# Patient Record
Sex: FEMALE | Race: WHITE | NOT HISPANIC OR LATINO | Employment: STUDENT | URBAN - METROPOLITAN AREA
[De-identification: names, ages, dates, MRNs, and addresses within clinical notes are randomized per-mention and may not be internally consistent; named-entity substitution may affect disease eponyms.]

---
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
## 2024-03-18 ENCOUNTER — OFFICE VISIT (OUTPATIENT)
Dept: PEDIATRIC CARDIOLOGY | Facility: CLINIC | Age: 19
End: 2024-03-18
Payer: COMMERCIAL

## 2024-03-18 ENCOUNTER — CLINICAL SUPPORT (OUTPATIENT)
Dept: PEDIATRIC CARDIOLOGY | Facility: CLINIC | Age: 19
End: 2024-03-18
Payer: COMMERCIAL

## 2024-03-18 VITALS
WEIGHT: 159.17 LBS | HEIGHT: 62 IN | DIASTOLIC BLOOD PRESSURE: 78 MMHG | SYSTOLIC BLOOD PRESSURE: 132 MMHG | HEART RATE: 93 BPM | OXYGEN SATURATION: 97 % | BODY MASS INDEX: 29.29 KG/M2

## 2024-03-18 DIAGNOSIS — Q20.0 TRUNCUS ARTERIOSUS (HHS-HCC): ICD-10-CM

## 2024-03-18 PROCEDURE — 93325 DOPPLER ECHO COLOR FLOW MAPG: CPT | Performed by: PEDIATRICS

## 2024-03-18 PROCEDURE — 1036F TOBACCO NON-USER: CPT | Performed by: PEDIATRICS

## 2024-03-18 PROCEDURE — 93320 DOPPLER ECHO COMPLETE: CPT | Performed by: PEDIATRICS

## 2024-03-18 PROCEDURE — 99215 OFFICE O/P EST HI 40 MIN: CPT | Performed by: PEDIATRICS

## 2024-03-18 PROCEDURE — 93303 ECHO TRANSTHORACIC: CPT | Performed by: PEDIATRICS

## 2024-03-18 RX ORDER — MULTIVIT-MIN/IRON FUM/FOLIC AC 7.5 MG-4
1 TABLET ORAL DAILY
COMMUNITY

## 2024-03-18 NOTE — LETTER
March 21, 2024     Carmen Trinidad MD  [address]  [place] [address]    Patient: Beth Sandoval   YOB: 2005   Date of Visit: 3/18/2024       Dear Dr. Carmen Trinidad MD:    Thank you for referring Beth Sandoval to me for evaluation. Below are my notes for this consultation.  If you have questions, please do not hesitate to call me. I look forward to following your patient along with you.       Sincerely,     William Gutierrez MD      CC: No Recipients  ______________________________________________________________________________________    We had the pleasure of seeing Beth Sandoval for a Pediatric Cardiology follow up evaluation. As you know Beth Sandoval is a 18 y.o. 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 She is here for routine follow up follow up. She was last seen in clinic on 3/20/2023.      History: born with Truncus arteriosus Type I s/p repair with VSD closure and placement of a 11 mm RV-PA conduit (2004), s/p 16 mm aortic homograft conduit attachment to a 20mm [place]-Juan Pablo ring due to conduit stenosis (06/02/2010), s/p Palmaz stent in the RV-PA conduit due to conduit stenosis (03/06/2015), s/p Lexie TPVR due to conduit stenosis with regurgitation and decline in VO2 (01/12/2017).     Beth gets a little short of breath when she is going all out in tennis. She has taken up roller skating this past year. Otherwise, there have been no symptoms related to the cardiovascular system. In particular, there is no history of chest pain, cyanosis, tachypnea, shortness of breath, dizziness or syncope. There are no fevers, feeding difficulty, decreased activity or weight loss.    Medications: has a current medication list which includes the following prescription(s): aspirin and multivitamin with minerals.  Past medical history:   Past Medical History:   Diagnosis Date   • Atherosclerotic heart disease of native coronary artery without angina pectoris     Coronary artery  "disease without angina pectoris, unspecified vessel or lesion type, unspecified whether native or transplanted heart   • Di Trevin's syndrome (CMS/[facility]) 08/10/2022    DiGeorge syndrome     Past surgical Hisory:  has a past surgical history that includes Other surgical history (06/04/2018); Other surgical history (06/04/2018); MR angio chest w IV contrast (02/03/2016); MR angio chest w IV contrast (08/29/2012); and Cardiac valuve replacement.  Allergies: has No Known Allergies.  Family history:  Negative for congenital heart disease, cardiomyopathy, long QT syndrome, unexplained seizures, aortic aneurysms, arrhythmias, early atherosclerotic/coronary cardiovascular diseases, congenital deafness and sudden unexpected death.  Social history:   Lives with mother. Active in tennis  Social History    Tobacco Use      Smoking status: Never      Smokeless tobacco: Never   reports that she has never smoked. She has never used smokeless tobacco.    /78 (BP Location: Right arm, Patient Position: Sitting)   Pulse 93   Ht 1.576 m (5' 2.05\")   Wt 72.2 kg (159 lb 2.8 oz)   SpO2 97%   BMI 29.07 kg/m²   She was resting comfortably in the examination room and alert, active and in no respiratory distress. Skin was without rash.  HEENT: moist mucous membranes, no JVD, goiter, carotid thrill or bruit or lymphadenopathy.  She had equal air entry with clear lung fields without crackles, rhonchi or wheeze. She was acyanotic with a normoactive precordium. Normal S1 and physiologically splitting S2. The P2 intensity was normal.  No clicks, rubs or gallops. There were no murmurs either in systole or diastole. Pulses in both upper and lower extremities were normal with no radio-femoral delay.  There was no peripheral edema.   The abdomen was soft, nontender with normal bowel sounds.  The liver was not palpable.  The spleen tip was not palpable.  She had a normal gait and normal strength in all extremities.  Cranial nerves II - XII are " intact.      Echocardiogram:  A two-dimensional sector scan and Doppler examination performed today and reviewed by me show:   1. Mildly dilated right atrium.   2. Estimated RV systolic pressure from the trivial TR jet is at least 43 mmHg above the right atrial V wave. Systolic blood pressure 121 mmHg.   3. Mildly dilated, mildly hypertrophied right ventricle and qualitatively normal systolic function. Mildly flattened interventricular septal motion.   4. Trivial to mild truncal valve insufficiency and no stenosis, mildly dilated truncal root.   5. Lexie valve peak/mean gradients are 38/20 mmHg_mildly increased from previous study.   6. Mildly prominent left and right pulmonary arteries and no proximal stenosis.   7. Truncus arteriosus status post repair.   8. Left ventricle is normal in size. Normal systolic function.   9. No residual ventricular septal defect.    Provider Impressions     Beth is an 18 y.o. 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 She continues to have mild color flow acceleration across the Lexie valve with trivial regurgitation and a well functioning truncal valve with mild regurgitation and no stenosis. She continues to be asymptomatic from a cardiac perspective.     1. Continue Aspirin 81 mg lifelong  2. SBE prophylaxis is indicated lifelong   3. No activity restrictions or interventions needed at this point in time  4. Follow up in 12 months with an ECG and echocardiogram  5. EST and cMRI in 1 years     Thank you for allowing me to participate in Beth's care.  If you have any further questions, please do not hesitate to contact me.     William Gutierrez M.D.  [facility], Director  Ambulatory Pediatric Cardiology   Division of Pediatric Cardiology  [facility]  [facility]  [facility]   of Pediatrics, [facility]  [facility] -   [address]  [place] [id number]  Office:  [phone]  Fax:       [phone]  e-mail:  [email or web address]

## 2024-03-18 NOTE — PATIENT INSTRUCTIONS
You have truncus arteriosus and have had complete repair. You have mild blockage across the valve after your transcatheter pulmonary valve (TPV) placement. I do not see significant backwards flow across the valve = pulmonary regurgitation.   You need to take antibiotics before dental visits. It is very important to practice good tooth brushing and flossing for healthy gums. You have been very stable over the past 3 years.  I would like you to continue your aspirin daily.  No need for other medications.  No restrictions to activity.  I would like to see you back in 1 year for echo and ECG.  We will repeat your cardiac MRI and exercise test in 2025.   If you feel any symptoms or concerns in the meantime, please call us on [phone].

## 2024-03-18 NOTE — PROGRESS NOTES
We had the pleasure of seeing Beth Sandoval for a Pediatric Cardiology follow up evaluation. As you know Beth Sandoval is a 18 y.o. 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 She is here for routine follow up follow up. She was last seen in clinic on 3/20/2023.      History: born with Truncus arteriosus Type I s/p repair with VSD closure and placement of a 11 mm RV-PA conduit (2004), s/p 16 mm aortic homograft conduit attachment to a 20mm [place]-Juan Pablo ring due to conduit stenosis (06/02/2010), s/p Palmaz stent in the RV-PA conduit due to conduit stenosis (03/06/2015), s/p Lexie TPVR due to conduit stenosis with regurgitation and decline in VO2 (01/12/2017).     Beth gets a little short of breath when she is going all out in tennis. She has taken up roller skating this past year. Otherwise, there have been no symptoms related to the cardiovascular system. In particular, there is no history of chest pain, cyanosis, tachypnea, shortness of breath, dizziness or syncope. There are no fevers, feeding difficulty, decreased activity or weight loss.    Medications: has a current medication list which includes the following prescription(s): aspirin and multivitamin with minerals.  Past medical history:   Past Medical History:   Diagnosis Date    Atherosclerotic heart disease of native coronary artery without angina pectoris     Coronary artery disease without angina pectoris, unspecified vessel or lesion type, unspecified whether native or transplanted heart    Di Trevin's syndrome (CMS/[facility]) 08/10/2022    DiGeorge syndrome     Past surgical Hisory:  has a past surgical history that includes Other surgical history (06/04/2018); Other surgical history (06/04/2018); MR angio chest w IV contrast (02/03/2016); MR angio chest w IV contrast (08/29/2012); and Cardiac valuve replacement.  Allergies: has No Known Allergies.  Family history:  Negative for congenital heart disease, cardiomyopathy, long QT  "syndrome, unexplained seizures, aortic aneurysms, arrhythmias, early atherosclerotic/coronary cardiovascular diseases, congenital deafness and sudden unexpected death.  Social history:   Lives with mother. Active in tennis  Social History    Tobacco Use      Smoking status: Never      Smokeless tobacco: Never   reports that she has never smoked. She has never used smokeless tobacco.    /78 (BP Location: Right arm, Patient Position: Sitting)   Pulse 93   Ht 1.576 m (5' 2.05\")   Wt 72.2 kg (159 lb 2.8 oz)   SpO2 97%   BMI 29.07 kg/m²   She was resting comfortably in the examination room and alert, active and in no respiratory distress. Skin was without rash.  HEENT: moist mucous membranes, no JVD, goiter, carotid thrill or bruit or lymphadenopathy.  She had equal air entry with clear lung fields without crackles, rhonchi or wheeze. She was acyanotic with a normoactive precordium. Normal S1 and physiologically splitting S2. The P2 intensity was normal.  No clicks, rubs or gallops. There were no murmurs either in systole or diastole. Pulses in both upper and lower extremities were normal with no radio-femoral delay.  There was no peripheral edema.   The abdomen was soft, nontender with normal bowel sounds.  The liver was not palpable.  The spleen tip was not palpable.  She had a normal gait and normal strength in all extremities.  Cranial nerves II - XII are intact.      Echocardiogram:  A two-dimensional sector scan and Doppler examination performed today and reviewed by me show:   1. Mildly dilated right atrium.   2. Estimated RV systolic pressure from the trivial TR jet is at least 43 mmHg above the right atrial V wave. Systolic blood pressure 121 mmHg.   3. Mildly dilated, mildly hypertrophied right ventricle and qualitatively normal systolic function. Mildly flattened interventricular septal motion.   4. Trivial to mild truncal valve insufficiency and no stenosis, mildly dilated truncal root.   5. Lexie " valve peak/mean gradients are 38/20 mmHg_mildly increased from previous study.   6. Mildly prominent left and right pulmonary arteries and no proximal stenosis.   7. Truncus arteriosus status post repair.   8. Left ventricle is normal in size. Normal systolic function.   9. No residual ventricular septal defect.    Provider Impressions     Beth is an 18 y.o. 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 She continues to have mild color flow acceleration across the Lexie valve with trivial regurgitation and a well functioning truncal valve with mild regurgitation and no stenosis. She continues to be asymptomatic from a cardiac perspective.     1. Continue Aspirin 81 mg lifelong  2. SBE prophylaxis is indicated lifelong   3. No activity restrictions or interventions needed at this point in time  4. Follow up in 12 months with an ECG and echocardiogram  5. EST and cMRI in 1 years     Thank you for allowing me to participate in Beth's care.  If you have any further questions, please do not hesitate to contact me.     William Gutierrez M.D.  [facility], Director  Ambulatory Pediatric Cardiology   Division of Pediatric Cardiology  [facility]  [facility]   of Pediatrics, [facility]  [facility] [address]  [place] [address]  Office:  [phone]  Fax:       [phone]  e-mail:  [email or web address]

## 2024-03-19 LAB
AORTIC VALVE PEAK VELOCITY: 1.11 M/S
AV PEAK GRADIENT: 4.9 MMHG
EJECTION FRACTION APICAL 4 CHAMBER: 63
LEFT VENTRICLE INTERNAL DIMENSION DIASTOLE MMODE: 5.67 CM
MITRAL VALVE E/A RATIO: 1.71
MITRAL VALVE E/E' RATIO: 4.88
PULMONIC VALVE MEAN GRADIENT: 20.4 MMHG
PULMONIC VALVE PEAK GRADIENT: 38.2 MMHG
TRICUSPID ANNULAR PLANE SYSTOLIC EXCURSION: 1.7 CM

## 2024-10-31 ENCOUNTER — OFFICE VISIT (OUTPATIENT)
Dept: PEDIATRIC CARDIOLOGY | Facility: CLINIC | Age: 19
End: 2024-10-31
Payer: COMMERCIAL

## 2024-10-31 ENCOUNTER — HOSPITAL ENCOUNTER (OUTPATIENT)
Dept: PEDIATRIC CARDIOLOGY | Facility: CLINIC | Age: 19
Discharge: HOME | End: 2024-10-31
Payer: COMMERCIAL

## 2024-10-31 VITALS
DIASTOLIC BLOOD PRESSURE: 99 MMHG | OXYGEN SATURATION: 100 % | HEART RATE: 79 BPM | SYSTOLIC BLOOD PRESSURE: 163 MMHG | BODY MASS INDEX: 25.55 KG/M2 | HEIGHT: 63 IN | WEIGHT: 144.18 LBS

## 2024-10-31 DIAGNOSIS — R00.2 PALPITATIONS: ICD-10-CM

## 2024-10-31 LAB
ATRIAL RATE: 70 BPM
BODY SURFACE AREA: 1.7 M2
P AXIS: 18 DEGREES
P OFFSET: 212 MS
P ONSET: 170 MS
PR INTERVAL: 116 MS
Q ONSET: 228 MS
QRS COUNT: 11 BEATS
QRS DURATION: 148 MS
QT INTERVAL: 414 MS
QTC CALCULATION(BAZETT): 447 MS
QTC FREDERICIA: 436 MS
R AXIS: 42 DEGREES
T AXIS: 36 DEGREES
T OFFSET: 435 MS
VENTRICULAR RATE: 70 BPM

## 2024-10-31 PROCEDURE — 99215 OFFICE O/P EST HI 40 MIN: CPT | Performed by: STUDENT IN AN ORGANIZED HEALTH CARE EDUCATION/TRAINING PROGRAM

## 2024-10-31 PROCEDURE — 93005 ELECTROCARDIOGRAM TRACING: CPT | Performed by: STUDENT IN AN ORGANIZED HEALTH CARE EDUCATION/TRAINING PROGRAM

## 2024-10-31 PROCEDURE — 3008F BODY MASS INDEX DOCD: CPT | Performed by: STUDENT IN AN ORGANIZED HEALTH CARE EDUCATION/TRAINING PROGRAM

## 2024-10-31 PROCEDURE — 93242 EXT ECG>48HR<7D RECORDING: CPT

## 2024-10-31 PROCEDURE — 93010 ELECTROCARDIOGRAM REPORT: CPT | Performed by: STUDENT IN AN ORGANIZED HEALTH CARE EDUCATION/TRAINING PROGRAM

## 2024-10-31 PROCEDURE — 1036F TOBACCO NON-USER: CPT | Performed by: STUDENT IN AN ORGANIZED HEALTH CARE EDUCATION/TRAINING PROGRAM

## 2024-10-31 ASSESSMENT — ENCOUNTER SYMPTOMS: PALPITATIONS: 1

## 2024-11-13 ENCOUNTER — TELEPHONE (OUTPATIENT)
Dept: PEDIATRIC CARDIOLOGY | Facility: HOSPITAL | Age: 19
End: 2024-11-13
Payer: COMMERCIAL

## 2024-11-13 LAB — BODY SURFACE AREA: 1.7 M2

## 2024-11-13 NOTE — TELEPHONE ENCOUNTER
This RN called on behalf of Dr. Garcia to relay Holter monitor results. Mom verbalized understanding with no further questions at this time.     Urged family to call with any other concerns or questions.     ANALY Fragoso      ----- Message from Jan Garcia sent at 11/13/2024  1:03 PM EST -----  Regarding: normal holter  Hi all,    Please let patient know that holter was normal.    Thank you,  Jan

## 2025-01-03 ENCOUNTER — APPOINTMENT (OUTPATIENT)
Dept: OTOLARYNGOLOGY | Facility: CLINIC | Age: 20
End: 2025-01-03
Payer: COMMERCIAL

## 2025-01-03 VITALS — WEIGHT: 148.6 LBS | BODY MASS INDEX: 26.33 KG/M2 | HEIGHT: 63 IN | TEMPERATURE: 97.4 F

## 2025-01-03 DIAGNOSIS — H61.23 BILATERAL IMPACTED CERUMEN: Primary | ICD-10-CM

## 2025-01-03 PROCEDURE — 3008F BODY MASS INDEX DOCD: CPT | Performed by: OTOLARYNGOLOGY

## 2025-01-03 PROCEDURE — 99213 OFFICE O/P EST LOW 20 MIN: CPT | Performed by: OTOLARYNGOLOGY

## 2025-01-03 PROCEDURE — 1036F TOBACCO NON-USER: CPT | Performed by: OTOLARYNGOLOGY

## 2025-01-03 NOTE — PROGRESS NOTES
"ALEJANDRINA Sandoval is a 19 y.o. female history of narrow ear canals, prone to sticky wax.  Feeling itchy in the ear canals bilaterally.  Hearing is reasonably stable although intermittent occlusion on the right..  Denies otalgia and otorrhea.  Last seen December 2023      Past Medical History:   Diagnosis Date    Atherosclerotic heart disease of native coronary artery without angina pectoris     Coronary artery disease without angina pectoris, unspecified vessel or lesion type, unspecified whether native or transplanted heart    Di Trevin's syndrome ([facility]) 08/10/2022    DiGeorge syndrome            Medications:     Current Outpatient Medications:     aspirin 81 mg EC tablet, Take 1 tablet (81 mg) by mouth once daily., Disp: , Rfl:     multivitamin with minerals tablet, Take 1 tablet by mouth once daily., Disp: , Rfl:      Allergies:  No Known Allergies     Physical Exam:  Last Recorded Vitals  Temperature 36.3 °C (97.4 °F), height 1.598 m (5' 2.9\"), weight 67.4 kg (148 lb 9.6 oz).  General:     General appearance: Well-developed, well-nourished in no acute distress.       Voice:  normal       Head/face: Normal appearance; nontender to palpation     Facial nerve function: Normal and symmetric bilaterally.    Oral/oropharynx:     Oral vestibule: Normal labial and gingival mucosa     Tongue/floor of mouth: Normal without lesion     Oropharynx: Clear.  No lesions present of the hard/soft palate, posterior pharynx    Neck:     Neck: Normal appearance, trachea midline     Salivary glands: Normal to palpation bilaterally     Lymph nodes: No cervical lymphadenopathy to palpation     Thyroid: No thyromegaly.  No palpable nodules     Range of motion: Normal    Neurological:     Cortical functions: Alert and oriented x3, appropriate affect       Larynx/hypopharynx:     Laryngeal findings: Mirror exam inadequate or limited secondary to enlarged base of tongue and/or excessive gagging    Ear:     Ear canal: Normal bilaterally " after cleaning cerumen impaction bilaterally with suction.     Tympanic membrane: Intact and mobile bilaterally     Pinna: Normal bilaterally     Hearing:  Gross hearing assessment normal by voice    Nose:     Visualized using: Anterior rhinoscopy     Nasopharynx: Inadequate mirror exam secondary to gag, anatomy.       Nasal dorsum: Nontraumatic midline appearance     Septum: Midline     Inferior turbinates: Normally sized     Mucosa: Bilateral, pink, normal appearing       Assessment/Plan   Ears cleaned bilaterally.  Hearing is normal at this point.  Recommend antidandruff shampoo, mineral oil, continued monitoring and recheck in a year, sooner as needed      Matt Harris MD

## 2025-01-17 ENCOUNTER — APPOINTMENT (OUTPATIENT)
Dept: OTOLARYNGOLOGY | Facility: CLINIC | Age: 20
End: 2025-01-17
Payer: COMMERCIAL

## 2025-03-10 ENCOUNTER — APPOINTMENT (OUTPATIENT)
Dept: PEDIATRIC CARDIOLOGY | Facility: CLINIC | Age: 20
End: 2025-03-10
Payer: COMMERCIAL

## 2025-04-07 ENCOUNTER — APPOINTMENT (OUTPATIENT)
Dept: PEDIATRIC CARDIOLOGY | Facility: CLINIC | Age: 20
End: 2025-04-07
Payer: COMMERCIAL

## 2025-04-07 ENCOUNTER — ANCILLARY PROCEDURE (OUTPATIENT)
Dept: PEDIATRIC CARDIOLOGY | Facility: CLINIC | Age: 20
End: 2025-04-07
Payer: COMMERCIAL

## 2025-04-07 VITALS
HEART RATE: 96 BPM | HEIGHT: 62 IN | SYSTOLIC BLOOD PRESSURE: 120 MMHG | DIASTOLIC BLOOD PRESSURE: 87 MMHG | BODY MASS INDEX: 27.22 KG/M2 | WEIGHT: 147.93 LBS | OXYGEN SATURATION: 100 %

## 2025-04-07 DIAGNOSIS — Q20.0 TRUNCUS ARTERIOSUS (HHS-HCC): Primary | ICD-10-CM

## 2025-04-07 DIAGNOSIS — Q20.0 TRUNCUS ARTERIOSUS (HHS-HCC): ICD-10-CM

## 2025-04-07 LAB
ATRIAL RATE: 90 BPM
P AXIS: 28 DEGREES
P OFFSET: 216 MS
P ONSET: 163 MS
PR INTERVAL: 130 MS
Q ONSET: 228 MS
QRS COUNT: 15 BEATS
QRS DURATION: 144 MS
QT INTERVAL: 368 MS
QTC CALCULATION(BAZETT): 450 MS
QTC FREDERICIA: 421 MS
R AXIS: 31 DEGREES
T AXIS: 34 DEGREES
T OFFSET: 412 MS
VENTRICULAR RATE: 90 BPM

## 2025-04-07 PROCEDURE — 3008F BODY MASS INDEX DOCD: CPT | Performed by: PEDIATRICS

## 2025-04-07 PROCEDURE — 93303 ECHO TRANSTHORACIC: CPT | Performed by: PEDIATRICS

## 2025-04-07 PROCEDURE — 93325 DOPPLER ECHO COLOR FLOW MAPG: CPT | Performed by: PEDIATRICS

## 2025-04-07 PROCEDURE — 93320 DOPPLER ECHO COMPLETE: CPT | Performed by: PEDIATRICS

## 2025-04-07 PROCEDURE — 93000 ELECTROCARDIOGRAM COMPLETE: CPT | Performed by: PEDIATRICS

## 2025-04-07 PROCEDURE — 99215 OFFICE O/P EST HI 40 MIN: CPT | Performed by: PEDIATRICS

## 2025-04-07 NOTE — LETTER
April 8, 2025     Carmen Trinidad MD  [address]  [place] [address]    Patient: Beth Sandoval   YOB: 2005   Date of Visit: 4/7/2025       Dear Dr. Carmen Trinidad MD:    Thank you for referring Beth Sandoval to me for evaluation. Below are my notes for this consultation.  If you have questions, please do not hesitate to call me. I look forward to following your patient along with you.       Sincerely,     William Gutierrez MD      CC: No Recipients  ______________________________________________________________________________________    We had the pleasure of seeing Beth Sandoval for a Pediatric Cardiology follow up evaluation. As you know Beth Sandoval is a 19 y.o. 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 She is here for routine follow up follow up. She was last seen in clinic on 3/18/2024.  Since her last visit she was seen by Dr. JAMARI Garcia for complaints of palpitations. Beth reports that these have since resolved. At that time she wore a Holter for three days which was normal.     History: born with Truncus arteriosus Type I s/p repair with VSD closure and placement of a 11 mm RV-PA conduit (2004), s/p 16 mm aortic homograft conduit attachment to a 20mm [place] ring due to conduit stenosis (06/02/2010), s/p Palmaz stent in the RV-PA conduit due to conduit stenosis (03/06/2015), s/p Lexie TPVR due to conduit stenosis with regurgitation and decline in VO2 (01/12/2017).     Beth is active in tennis and roller skating. The shortness of breath she experienced last year has resolved. Otherwise, there have been no symptoms related to the cardiovascular system. In particular, there is no history of chest pain, cyanosis, tachypnea, shortness of breath, dizziness or syncope. There are no fevers, feeding difficulty, decreased activity or weight loss.    Medications: has a current medication list which includes the following prescription(s): aspirin and multivitamin  "with minerals.  Past medical history:   Past Medical History:   Diagnosis Date   • Atherosclerotic heart disease of native coronary artery without angina pectoris     Coronary artery disease without angina pectoris, unspecified vessel or lesion type, unspecified whether native or transplanted heart   • Di Trevin's syndrome (Multi) 08/10/2022    DiGeorge syndrome     Past surgical Hisory:  has a past surgical history that includes Other surgical history (06/04/2018); Other surgical history (06/04/2018); MR angio chest w IV contrast (02/03/2016); MR angio chest w IV contrast (08/29/2012); and Cardiac valuve replacement.  Allergies: has No Known Allergies.  Family history:  Negative for congenital heart disease, cardiomyopathy, long QT syndrome, unexplained seizures, aortic aneurysms, arrhythmias, early atherosclerotic/coronary cardiovascular diseases, congenital deafness and sudden unexpected death.  Social history:   Lives with mother. Active in tennis  Social History     Tobacco Use   Smoking Status Never   Smokeless Tobacco Never    reports that she has never smoked. She has never used smokeless tobacco.    /87 (BP Location: Right arm, Patient Position: Sitting)   Pulse 96   Ht 1.572 m (5' 1.89\")   Wt 67.1 kg (147 lb 14.9 oz)   SpO2 100%   BMI 27.15 kg/m²   She was resting comfortably in the examination room and alert, active and in no respiratory distress. Skin was without rash.  HEENT: moist mucous membranes, no JVD, goiter, carotid thrill or bruit or lymphadenopathy.  She had equal air entry with clear lung fields without crackles, rhonchi or wheeze. She was acyanotic with a normoactive precordium. Normal S1 and physiologically splitting S2. The P2 intensity was normal.  No clicks, rubs or gallops. There were no murmurs either in systole or diastole. Pulses in both upper and lower extremities were normal with no radio-femoral delay.  There was no peripheral edema.   The abdomen was soft, nontender " with normal bowel sounds.  The liver was not palpable.  The spleen tip was not palpable.  She had a normal gait and normal strength in all extremities.  Cranial nerves II - XII are intact.      Echocardiogram:  A two-dimensional sector scan and Doppler examination performed today and reviewed by me show:   1. Mildly dilated right atrium.   2. Estimated RV systolic pressure from the trivial TR jet is at least 43 mmHg above the right atrial V wave. Systolic blood pressure 121 mmHg.   3. Mildly dilated, mildly hypertrophied right ventricle and qualitatively normal systolic function. Mildly flattened interventricular septal motion.   4. Trivial to mild truncal valve insufficiency and no stenosis, mildly dilated truncal root.   5. Lexie valve peak/mean gradients are 38/20 mmHg_mildly increased from previous study.   6. Mildly prominent left and right pulmonary arteries and no proximal stenosis.   7. Truncus arteriosus status post repair.   8. Left ventricle is normal in size. Normal systolic function.   9. No residual ventricular septal defect.    Provider Impressions     Beth is an 19 y.o. 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 She continues to have mild color flow acceleration across the Elxie valve with trivial regurgitation and a well functioning truncal valve with mild regurgitation and no stenosis. She continues to be asymptomatic from a cardiac perspective.     1. Continue Aspirin 81 mg lifelong  2. SBE prophylaxis IS indicated lifelong   3. No activity restrictions or interventions needed at this point in time  4. Follow up in 12 months with an ECG and echocardiogram  5. EST and cMRI in this summer     Thank you for allowing me to participate in Beth's care.  If you have any further questions,  please do not hesitate to contact me.     William Gutierrez M.D.  [facility], Director  Ambulatory Pediatric Cardiology   Division of Pediatric Cardiology  [facility]  [facility]   of Pediatrics, [facility]  [facility] [address]  [place] [address]  Office:  [phone]  Fax:       [phone]  e-mail:  [email or web address]

## 2025-04-07 NOTE — PROGRESS NOTES
We had the pleasure of seeing Beth Sandoval for a Pediatric Cardiology follow up evaluation. As you know Beth Sandoval is a 19 y.o. 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 She is here for routine follow up follow up. She was last seen in clinic on 3/18/2024.  Since her last visit she was seen by Dr. JAMARI Garcia for complaints of palpitations. Beth reports that these have since resolved. At that time she wore a Holter for three days which was normal.     History: born with Truncus arteriosus Type I s/p repair with VSD closure and placement of a 11 mm RV-PA conduit (2004), s/p 16 mm aortic homograft conduit attachment to a 20mm [place] ring due to conduit stenosis (06/02/2010), s/p Palmaz stent in the RV-PA conduit due to conduit stenosis (03/06/2015), s/p Lexie TPVR due to conduit stenosis with regurgitation and decline in VO2 (01/12/2017).     Beth is active in tennis and roller skating. The shortness of breath she experienced last year has resolved. Otherwise, there have been no symptoms related to the cardiovascular system. In particular, there is no history of chest pain, cyanosis, tachypnea, shortness of breath, dizziness or syncope. There are no fevers, feeding difficulty, decreased activity or weight loss.    Medications: has a current medication list which includes the following prescription(s): aspirin and multivitamin with minerals.  Past medical history:   Past Medical History:   Diagnosis Date    Atherosclerotic heart disease of native coronary artery without angina pectoris     Coronary artery disease without angina pectoris, unspecified vessel or lesion type, unspecified whether native or transplanted heart    Di Trevin's syndrome (Multi) 08/10/2022    DiGeorge syndrome     Past surgical Hisory:  has a past surgical history that includes Other surgical history (06/04/2018); Other surgical history (06/04/2018); MR angio chest w IV contrast (02/03/2016); MR angio chest w IV  "contrast (08/29/2012); and Cardiac valuve replacement.  Allergies: has No Known Allergies.  Family history:  Negative for congenital heart disease, cardiomyopathy, long QT syndrome, unexplained seizures, aortic aneurysms, arrhythmias, early atherosclerotic/coronary cardiovascular diseases, congenital deafness and sudden unexpected death.  Social history:   Lives with mother. Active in tennis  Social History     Tobacco Use   Smoking Status Never   Smokeless Tobacco Never    reports that she has never smoked. She has never used smokeless tobacco.    /87 (BP Location: Right arm, Patient Position: Sitting)   Pulse 96   Ht 1.572 m (5' 1.89\")   Wt 67.1 kg (147 lb 14.9 oz)   SpO2 100%   BMI 27.15 kg/m²   She was resting comfortably in the examination room and alert, active and in no respiratory distress. Skin was without rash.  HEENT: moist mucous membranes, no JVD, goiter, carotid thrill or bruit or lymphadenopathy.  She had equal air entry with clear lung fields without crackles, rhonchi or wheeze. She was acyanotic with a normoactive precordium. Normal S1 and physiologically splitting S2. The P2 intensity was normal.  No clicks, rubs or gallops. There were no murmurs either in systole or diastole. Pulses in both upper and lower extremities were normal with no radio-femoral delay.  There was no peripheral edema.   The abdomen was soft, nontender with normal bowel sounds.  The liver was not palpable.  The spleen tip was not palpable.  She had a normal gait and normal strength in all extremities.  Cranial nerves II - XII are intact.      Echocardiogram:  A two-dimensional sector scan and Doppler examination performed today and reviewed by me show:   1. Mildly dilated right atrium.   2. Estimated RV systolic pressure from the trivial TR jet is at least 43 mmHg above the right atrial V wave. Systolic blood pressure 121 mmHg.   3. Mildly dilated, mildly hypertrophied right ventricle and qualitatively normal " systolic function. Mildly flattened interventricular septal motion.   4. Trivial to mild truncal valve insufficiency and no stenosis, mildly dilated truncal root.   5. Lexie valve peak/mean gradients are 38/20 mmHg_mildly increased from previous study.   6. Mildly prominent left and right pulmonary arteries and no proximal stenosis.   7. Truncus arteriosus status post repair.   8. Left ventricle is normal in size. Normal systolic function.   9. No residual ventricular septal defect.    Provider Impressions     Beth is an 19 y.o. 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 She continues to have mild color flow acceleration across the Lexie valve with trivial regurgitation and a well functioning truncal valve with mild regurgitation and no stenosis. She continues to be asymptomatic from a cardiac perspective.     1. Continue Aspirin 81 mg lifelong  2. SBE prophylaxis IS indicated lifelong   3. No activity restrictions or interventions needed at this point in time  4. Follow up in 12 months with an ECG and echocardiogram  5. EST and cMRI in this summer     Thank you for allowing me to participate in Beth's care.  If you have any further questions, please do not hesitate to contact me.     William Gutierrez M.D.  [facility], Director  Ambulatory Pediatric Cardiology   Division of Pediatric Cardiology  [facility]  [facility]   of Pediatrics, [facility]  [facility] -   [address]  [place] [id number]  Office:  [phone]  Fax:       [phone]  e-mail:  [email or web address]

## 2025-04-07 NOTE — PATIENT INSTRUCTIONS
You have truncus arteriosus and have had complete repair. You have mild blockage across the valve after your transcatheter pulmonary valve (TPV) placement. I do not see significant backwards flow across the valve = pulmonary regurgitation.   You need to take antibiotics before dental visits. It is very important to practice good tooth brushing and flossing for healthy gums. You have been very stable over the past 3 years.  I would like you to continue your aspirin daily.  No need for other medications.  No restrictions to activity.  I would like to see you back in 1 year for echo and ECG.  We will repeat your cardiac MRI and exercise test in 2025 (ordered today, our team should call you).   If you feel any symptoms or concerns in the meantime, please call us on [phone].

## 2025-04-08 LAB
AORTIC VALVE PEAK GRADIENT PEDS: 6.89 MM2
AORTIC VALVE PEAK VELOCITY: 1.26 M/S
AV PEAK GRADIENT: 6.3 MMHG
EJECTION FRACTION APICAL 4 CHAMBER: 67
MITRAL VALVE E/A RATIO: 1.42
MITRAL VALVE E/E' RATIO: 7.52
PULMONIC VALVE MEAN GRADIENT: 25 MMHG
PULMONIC VALVE PEAK GRADIENT: 45.7 MMHG
TRICUSPID ANNULAR PLANE SYSTOLIC EXCURSION: 1.3 CM

## 2025-04-28 ENCOUNTER — HOSPITAL ENCOUNTER (OUTPATIENT)
Dept: PEDIATRIC CARDIOLOGY | Facility: HOSPITAL | Age: 20
Discharge: HOME | End: 2025-04-28
Payer: COMMERCIAL

## 2025-04-28 VITALS — WEIGHT: 150.13 LBS | HEIGHT: 63 IN | BODY MASS INDEX: 26.6 KG/M2

## 2025-04-28 DIAGNOSIS — Q20.0 TRUNCUS ARTERIOSUS (HHS-HCC): ICD-10-CM

## 2025-04-28 PROCEDURE — 94621 CARDIOPULM EXERCISE TESTING: CPT | Performed by: PEDIATRICS

## 2025-04-28 PROCEDURE — 94621 CARDIOPULM EXERCISE TESTING: CPT

## 2026-04-20 ENCOUNTER — APPOINTMENT (OUTPATIENT)
Dept: PEDIATRIC CARDIOLOGY | Facility: CLINIC | Age: 21
End: 2026-04-20
Payer: COMMERCIAL